# Patient Record
(demographics unavailable — no encounter records)

---

## 2025-05-29 NOTE — HISTORY OF PRESENT ILLNESS
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 Son states he found her with incoherent speech with vomitus near her. BIBEMS to [facility] [place] ED, noted to have slurred speech, L sided weakness and neglect. NIH 21. CTH showed no acute process. CTA/CTP showed R M2 occlusion with 49mL area of hypoperfusion and 4mL core infarct. Pt transferred to [facility], is now s/p emergent Mechanical Thrombectomy(MT). TICI 2B, 3 passes. MRI brain confirmed Large R MCA stroke secondary to ICAD (considering multivessel stenoses) vs cardioembolic. 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  EP was consulted and ILR was recommended to r/o arrhythmogenic causes of CVA  Patient presents today for initial ILR interrogation. She is accompanied by aid. Pt responds with eye movement only.

## 2025-05-29 NOTE — PROCEDURE
[NSR] : normal sinus rhythm [See Device Printout] : See device printout [Normal] : The battery status is normal. [Sensing Amplitude ___mv] : sensing amplitude was [unfilled] mv [de-identified] : [employer] [de-identified] : LINQ II [de-identified] : [id number] [de-identified] : 03/12/2025 [de-identified] : (53) False AF events - c/w SR with APCs

## 2025-05-29 NOTE — PHYSICAL EXAM
[General Appearance - In No Acute Distress] : no acute distress [Heart Rate And Rhythm] : heart rate and rhythm were normal [] : no respiratory distress [Well-Healed] : well-healed [FreeTextEntry1] : L-sternal border, 4th intercostal space

## 2025-05-29 NOTE — ASSESSMENT
[FreeTextEntry1] : # CVA # s/p ILR (MDT) implanted 3/12/2025  - Incision site well healed, clean, dry, no drainage, no redness, no bruising. The patient has no pain.  - Device interrogated and reprogrammed as described in procedure. Device function normal. No events. See Device Printout - False AF c/w SR with APCs. This is a precursor, will closely monitor.  # Remote monitoring - patient is enrolled - Remote monitoring was discussed with patient and aid, schedule, process, as well as associated co-pay that may not be covered by their insurance.   RTC 12mo / PRN

## 2025-05-29 NOTE — CARDIOLOGY SUMMARY
[de-identified] : ECHO: 1. Normal global left ventricular systolic function. 2. LV Ejection Fraction by Jean Baptiste's Method with a biplane EF of 57 %. 3. Spectral Doppler shows impaired relaxation pattern of left ventricular myocardial filling (Grade I diastolic dysfunction). 4. Normal right ventricular size and function. 5. Moderately enlarged left atrium. 6. Normal right atrial size. 7. Mild mitral valve regurgitation. 8. Mild-moderate tricuspid regurgitation. 9. Sclerotic aortic valve with decreased opening. Mean PG 6 mmHg, calculated THEA 1.4 cm\S\2. 10. Estimated pulmonary artery systolic pressure is 60.1 mmHg assuming a right atrial pressure of 8 mmHg, which is consistent with severe pulmonary hypertension.  [de-identified] :  3/12/2025: NORMA (MDT) implant

## 2025-07-08 NOTE — REASON FOR VISIT
[Initial Consultation] : an initial consultation [Other: _____] : [unfilled] [FreeTextEntry2] : left internal iliac chain lymphadenopathy

## 2025-07-08 NOTE — CONSULT LETTER
[Dear  ___] : Dear  [unfilled], [Courtesy Letter:] : I had the pleasure of seeing your patient, [unfilled], in my office today. [Please see my note below.] : Please see my note below. [Consult Closing:] : Thank you very much for allowing me to participate in the care of this patient.  If you have any questions, please do not hesitate to contact me. [Sincerely,] : Sincerely, [FreeTextEntry3] : (Samia Faat) Moy Bains DO Medical oncology/hematology at [facility]  [DrBernard  ___] : Dr. RANGEL

## 2025-07-08 NOTE — ASSESSMENT
[FreeTextEntry1] : In summary, Ms. Eric is an 88 year-old female found unresponsive at home and presented to the hospital and found to have acute ischemic stroke in the right MCA region. She was incidentally found to have a 2 cm malignant appearing left iliac chain lymph node.  # Suspicious 2 x 1.6 cm left internal iliac chain lymph node --repeat CBC, CMP, LDH, uric acid, ESR and CRP --repeat CT CAP w/ IV con to assess stability of the lymphadenopathy --given overall condition of pt, likely would recommend best supportive care and observation  # CVA, now nonverbal and bedbound --f/u neuro  RTC in 1 month to go over CT CAP results.

## 2025-07-08 NOTE — PHYSICAL EXAM
[Completely disabled. Cannot carry on any self care. Totally confined to bed or chair] : Status 4- Completely disabled. Cannot carry on any self care. Totally confined to bed or chair [Thin] : thin [Normal] : normoactive bowel sounds, soft and nontender, no hepatosplenomegaly or masses appreciated [de-identified] : Present in a wheelchair [de-identified] : Nonverbal

## 2025-07-08 NOTE — PHYSICAL EXAM
[Completely disabled. Cannot carry on any self care. Totally confined to bed or chair] : Status 4- Completely disabled. Cannot carry on any self care. Totally confined to bed or chair [Thin] : thin [Normal] : normoactive bowel sounds, soft and nontender, no hepatosplenomegaly or masses appreciated [de-identified] : Present in a wheelchair [de-identified] : Nonverbal

## 2025-07-08 NOTE — RESULTS/DATA
[FreeTextEntry1] : CT AP 3/23/25 IMPRESSION: 1. No CTA evidence of active gastrointestinal bleed. 2. Near-complete resolution of right lower lobe pneumonia. 3. Interval percutaneous gastrostomy with expected recent postsurgical change. 4. Unchanged suspicious 2 x 1.6 cm left internal iliac chain lymph node.

## 2025-07-08 NOTE — HISTORY OF PRESENT ILLNESS
[Disease: _____________________] : Disease: [unfilled] [de-identified] : Ms. Eric is an 88-year-old female with HLD, HTN and pre-DM. She was initially found unresponsive at home and presented to the hospital from 3/7/25 to 3/31/25. She was found to have acute ischemic stroke in the right MCA region.  She was incidentally found to have a 2 cm malignant appearing left iliac chain lymph node, and oncology was consulted for further workup.  CT AP 3/23/25 IMPRESSION: 1. No CTA evidence of active gastrointestinal bleed. 2. Near-complete resolution of right lower lobe pneumonia. 3. Interval percutaneous gastrostomy with expected recent postsurgical change. 4. Unchanged suspicious 2 x 1.6 cm left internal iliac chain lymph node.  Pt is currently residing in [facility] and today, she is accompanied by her aid for the hospital follow up. She is non-verbal and bedbound on baseline. Multiple attempt to contact yaquelin Cervantes without reply. No further history was able to be obtained today. Discussed with [facility] RN regarding plan.   [ECOG Performance Status: 4 - Completely disabled. Cannot carry on any self care. Totally confined to bed or chair] : Performance Status: 4 - Completely disabled. Cannot carry on any self care. Totally confined to bed or chair

## 2025-07-08 NOTE — CONSULT LETTER
[Dear  ___] : Dear  [unfilled], [Courtesy Letter:] : I had the pleasure of seeing your patient, [unfilled], in my office today. [Please see my note below.] : Please see my note below. [Consult Closing:] : Thank you very much for allowing me to participate in the care of this patient.  If you have any questions, please do not hesitate to contact me. [Sincerely,] : Sincerely, [FreeTextEntry3] : (Samia Faat) Moy Bains DO Medical oncology/hematology at [facility]  [DrBernard  ___] : Dr. RANGEL

## 2025-07-08 NOTE — HISTORY OF PRESENT ILLNESS
[Disease: _____________________] : Disease: [unfilled] [de-identified] : Ms. Eric is an 88-year-old female with HLD, HTN and pre-DM. She was initially found unresponsive at home and presented to the hospital from 3/7/25 to 3/31/25. She was found to have acute ischemic stroke in the right MCA region.  She was incidentally found to have a 2 cm malignant appearing left iliac chain lymph node, and oncology was consulted for further workup.  CT AP 3/23/25 IMPRESSION: 1. No CTA evidence of active gastrointestinal bleed. 2. Near-complete resolution of right lower lobe pneumonia. 3. Interval percutaneous gastrostomy with expected recent postsurgical change. 4. Unchanged suspicious 2 x 1.6 cm left internal iliac chain lymph node.  Pt is currently residing in [facility] and today, she is accompanied by her aid for the hospital follow up. She is non-verbal and bedbound on baseline. Multiple attempt to contact yaquelin Cervantes without reply. No further history was able to be obtained today. Discussed with [facility] RN regarding plan.   [ECOG Performance Status: 4 - Completely disabled. Cannot carry on any self care. Totally confined to bed or chair] : Performance Status: 4 - Completely disabled. Cannot carry on any self care. Totally confined to bed or chair